# Patient Record
Sex: MALE | Race: OTHER
[De-identification: names, ages, dates, MRNs, and addresses within clinical notes are randomized per-mention and may not be internally consistent; named-entity substitution may affect disease eponyms.]

---

## 2019-08-10 ENCOUNTER — HOSPITAL ENCOUNTER (EMERGENCY)
Dept: HOSPITAL 56 - MW.ED | Age: 44
Discharge: HOME | End: 2019-08-10
Payer: COMMERCIAL

## 2019-08-10 DIAGNOSIS — Z98.890: ICD-10-CM

## 2019-08-10 DIAGNOSIS — Z90.49: ICD-10-CM

## 2019-08-10 DIAGNOSIS — S92.902A: Primary | ICD-10-CM
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
## 2019-08-10 NOTE — CR
INDICATION: Trauma



COMPARISON:



None available.



FINDINGS:



AP and lateral views of the left foot demonstrate normal mineralization and 

alignment. There is a subtle linear lucency at the base of the 3rd 

metatarsal, seen only on the AP view. Remaining osseous structures appear 

intact. Incidental note of a cornuate configuration of the navicular. 

Dorsal soft tissue swelling.



IMPRESSION:



Subtle linear lucency at the base of the 3rd metatarsal, which in 

conjunction with midfoot soft tissue swelling and a history of trauma, is 

suspicious for a nondisplaced fracture. Consider an oblique view for 

further evaluation.



Dictated by Karan Dutton MD @ 8/10/2019 11:05:24 AM



Dictated by: Karan Dutton MD @ 08/10/2019 11:05:33



(Electronically Signed)

## 2019-08-15 NOTE — EDM.PDOC
ED HPI GENERAL MEDICAL PROBLEM





- General


Chief Complaint: Lower Extremity Injury/Pain


Stated Complaint: INJURED LT FT


Time Seen by Provider: 08/10/19 08:25


Source of Information: Reports: Patient


History Limitations: Reports: No Limitations





- History of Present Illness


INITIAL COMMENTS - FREE TEXT/NARRATIVE: 


History of present illness:


[]Patient missed a step today around 7:30 in his foot folded underneath him he 

felt pop and now has swelling and pain on the top of his foot. Injuries at this 

time. She did take ibuprofen prior to arrival.





Review of systems: 


As per history of present illness and below otherwise all systems reviewed and 

negative.





Past medical history: 


As per history of present illness and as reviewed below otherwise 

noncontributory.





Surgical history: 


As per history of present illness and as reviewed below otherwise 

noncontributory.





Social history: 


No reported history of drug or alcohol abuse.





Family history: 


As per history of present illness and as reviewed below otherwise 

noncontributory.





Physical exam:


General: Well developed, well nourished in NAD


HEENT: Atraumatic, normocephalic, pupils reactive, negative for conjunctival 

pallor or scleral icterus, mucous membranes moist, throat clear, neck supple, 

nontender, trachea midline.


Lungs: Clear to auscultation, breath sounds equal bilaterally, chest nontender.


Heart: S1S2, regular, negative for clicks, rubs, or JVD.


Abdomen: NABS, Soft, nondistended, nontender. Negative for masses or 

hepatosplenomegaly. Negative for costovertebral tenderness.


Pelvis: Stable nontender.


Genitourinary: Deferred.


Rectal: Deferred.


Extremities: Atraumatic, negative for cords or calf pain. Neurovascular 

unremarkable.


Neuro: Awake, alert, oriented. Cranial nerves II through XII unremarkable. 

Cerebellum unremarkable. Motor and sensory unremarkable throughout. Exam 

nonfocal.


Skin:warm and dry





Diagnostics:


X-ray left foot shows: Subtle linear lucency at the base of the 3rd metatarsal, 

which in conjunction with midfoot soft tissue swelling and a history of trauma, 

is suspicious for a nondisplaced fracture. Consider an oblique view for further 

evaluation.








Therapeutics:


Walking boot





ED Course:


Stable





Impression: 


Left foot fracture





Prescriptions:


Tramadol





Plan:


Follow-up with Dr. michael the podiatry office or return to ER if symptoms worsen 

or change. Ice elevate and ibuprofen tramadol for pain as directed.





Definitive disposition and diagnosis as appropriate pending reevaluation and 

review of above.





  ** left foot


Pain Score (Numeric/FACES): 0





- Related Data


 Allergies











Allergy/AdvReac Type Severity Reaction Status Date / Time


 


No Known Allergies Allergy   Verified 08/10/19 08:39











Home Meds: 


 Home Meds





traMADol HCl [Tramadol HCl] 50 mg PO Q6H PRN #20 tablet 08/10/19 [Rx]











Past Medical History





- Infectious Disease History


Infectious Disease History: Reports: Chicken Pox





- Past Surgical History


HEENT Surgical History: Reports: Tonsillectomy


GI Surgical History: Reports: Appendectomy





Social & Family History





- Family History


Family Medical History: Noncontributory





- Tobacco Use


Smoking Status *Q: Never Smoker





- Recreational Drug Use


Recreational Drug Use: No





Review of Systems





- Review of Systems


Review Of Systems: See Below





ED EXAM, GENERAL





- Physical Exam


Exam: See Below





Course





- Vital Signs


Last Recorded V/S: 





 Last Vital Signs











Temp  97.4 F   08/10/19 08:39


 


Pulse  57 L  08/10/19 08:39


 


Resp  18   08/10/19 08:39


 


BP  111/70   08/10/19 08:39


 


Pulse Ox  96   08/10/19 08:39














Departure





- Departure


Time of Disposition: 09:45


Disposition: Home, Self-Care 01


Condition: Good


Clinical Impression: 


Foot fracture, left


Qualifiers:


 Encounter type: initial encounter Fracture type: closed Qualified Code(s): 

S92.902A - Unspecified fracture of left foot, initial encounter for closed 

fracture








- Discharge Information


*PRESCRIPTION DRUG MONITORING PROGRAM REVIEWED*: Not Applicable


*COPY OF PRESCRIPTION DRUG MONITORING REPORT IN PATIENT JEAN-PIERRE: Not Applicable


Prescriptions: 


traMADol HCl [Tramadol HCl] 50 mg PO Q6H PRN #20 tablet


 PRN Reason: Pain


Instructions:  Foot Sprain


Referrals: 


Den Rubio MD [Primary Care Provider] - 


Forms:  ED Department Discharge


Additional Instructions: 


The following information is given to patients seen in the emergency department 

who are being discharged to home. This information is to outline your options 

for follow-up care. We provide all patients seen in our emergency department 

with a follow-up referral.





The need for follow-up, as well as the timing and circumstances, are variable 

depending upon the specifics of your emergency department visit.





If you don't have a primary care physician on staff, we will provide you with a 

referral. We always advise you to contact your personal physician following an 

emergency department visit to inform them of the circumstance of the visit and 

for follow-up with them and/or the need for any referrals to a consulting 

specialist.





The emergency department will also refer you to a specialist when appropriate. 

This referral assures that you have the opportunity for follow-up care with a 

specialist. All of these measure are taken in an effort to provide you with 

optimal care, which includes your follow-up.





Under all circumstances we always encourage you to contact your private 

physician who remains a resource for coordinating your care. When calling for 

follow-up care, please make the office aware that this follow-up is from your 

recent emergency room visit. If for any reason you are refused follow-up, 

please contact the [facility] Emergency 

Department at [phone] and asked to speak to the emergency department 

charge nurse.





Take meds as directed, follow up with your primary care physician, return to ER 

if symptoms worsen or change.








My Dr Khushbu Ríos, DPM


[address] [place] [address] [id number]


[place]  [address]


Phone:  [phone]